# Patient Record
Sex: MALE | Race: WHITE | NOT HISPANIC OR LATINO | Employment: OTHER | URBAN - METROPOLITAN AREA
[De-identification: names, ages, dates, MRNs, and addresses within clinical notes are randomized per-mention and may not be internally consistent; named-entity substitution may affect disease eponyms.]

---

## 2023-10-19 DIAGNOSIS — H40.1131 PRIMARY OPEN ANGLE GLAUCOMA (POAG) OF BOTH EYES, MILD STAGE: Primary | ICD-10-CM

## 2023-10-20 RX ORDER — TIMOLOL MALEATE 2.5 MG/ML
SOLUTION/ DROPS OPHTHALMIC
Qty: 15 ML | Refills: 0 | Status: SHIPPED | OUTPATIENT
Start: 2023-10-20 | End: 2023-12-05 | Stop reason: SDUPTHER

## 2023-12-05 ENCOUNTER — OFFICE VISIT (OUTPATIENT)
Dept: OPHTHALMOLOGY | Facility: CLINIC | Age: 63
End: 2023-12-05
Payer: MEDICARE

## 2023-12-05 DIAGNOSIS — H40.1131 PRIMARY OPEN ANGLE GLAUCOMA (POAG) OF BOTH EYES, MILD STAGE: ICD-10-CM

## 2023-12-05 DIAGNOSIS — H40.1132 PRIMARY OPEN ANGLE GLAUCOMA (POAG) OF BOTH EYES, MODERATE STAGE: Primary | ICD-10-CM

## 2023-12-05 PROCEDURE — 99212 OFFICE O/P EST SF 10 MIN: CPT | Performed by: OPHTHALMOLOGY

## 2023-12-05 RX ORDER — COVID-19 MOLECULAR TEST ASSAY
KIT MISCELLANEOUS
COMMUNITY
Start: 2022-10-28

## 2023-12-05 RX ORDER — TIMOLOL MALEATE 2.5 MG/ML
SOLUTION/ DROPS OPHTHALMIC
Qty: 15 ML | Refills: 6 | Status: SHIPPED | OUTPATIENT
Start: 2023-12-05 | End: 2024-04-23

## 2023-12-05 RX ORDER — COLCHICINE 0.6 MG/1
TABLET ORAL
COMMUNITY
Start: 2023-02-23

## 2023-12-05 ASSESSMENT — CONF VISUAL FIELD
OD_INFERIOR_TEMPORAL_RESTRICTION: 0
OD_SUPERIOR_TEMPORAL_RESTRICTION: 0
OD_SUPERIOR_NASAL_RESTRICTION: 0
OD_INFERIOR_NASAL_RESTRICTION: 0
OS_INFERIOR_TEMPORAL_RESTRICTION: 0
OD_NORMAL: 1
OS_NORMAL: 1
OS_SUPERIOR_TEMPORAL_RESTRICTION: 0
OS_INFERIOR_NASAL_RESTRICTION: 0
OS_SUPERIOR_NASAL_RESTRICTION: 0

## 2023-12-05 ASSESSMENT — TONOMETRY
IOP_METHOD: GOLDMANN APPLANATION
OD_IOP_MMHG: 11
OS_IOP_MMHG: 13

## 2023-12-05 ASSESSMENT — VISUAL ACUITY
OD_CC: 20/20
METHOD: SNELLEN - LINEAR
OS_CC: 20/20
CORRECTION_TYPE: GLASSES

## 2023-12-05 ASSESSMENT — SLIT LAMP EXAM - LIDS
COMMENTS: NORMAL
COMMENTS: NORMAL

## 2023-12-05 ASSESSMENT — EXTERNAL EXAM - RIGHT EYE: OD_EXAM: NORMAL

## 2023-12-05 ASSESSMENT — CUP TO DISC RATIO
OS_RATIO: 0.5
OD_RATIO: 0.55

## 2023-12-05 ASSESSMENT — EXTERNAL EXAM - LEFT EYE: OS_EXAM: NORMAL

## 2024-04-23 DIAGNOSIS — H40.1131 PRIMARY OPEN ANGLE GLAUCOMA (POAG) OF BOTH EYES, MILD STAGE: ICD-10-CM

## 2024-04-23 RX ORDER — TIMOLOL MALEATE 2.5 MG/ML
SOLUTION/ DROPS OPHTHALMIC
Qty: 15 ML | Refills: 0 | Status: SHIPPED | OUTPATIENT
Start: 2024-04-23

## 2024-06-18 ENCOUNTER — APPOINTMENT (OUTPATIENT)
Dept: OPHTHALMOLOGY | Facility: CLINIC | Age: 64
End: 2024-06-18
Payer: MEDICARE

## 2024-06-18 DIAGNOSIS — H40.1131 PRIMARY OPEN ANGLE GLAUCOMA (POAG) OF BOTH EYES, MILD STAGE: ICD-10-CM

## 2024-06-18 DIAGNOSIS — H40.1132 PRIMARY OPEN ANGLE GLAUCOMA (POAG) OF BOTH EYES, MODERATE STAGE: Primary | ICD-10-CM

## 2024-06-18 PROCEDURE — 92083 EXTENDED VISUAL FIELD XM: CPT | Performed by: OPHTHALMOLOGY

## 2024-06-18 PROCEDURE — 99214 OFFICE O/P EST MOD 30 MIN: CPT | Performed by: OPHTHALMOLOGY

## 2024-06-18 RX ORDER — TIMOLOL MALEATE 2.5 MG/ML
SOLUTION/ DROPS OPHTHALMIC
Qty: 15 ML | Refills: 0 | Status: SHIPPED | OUTPATIENT
Start: 2024-06-18

## 2024-06-18 ASSESSMENT — EXTERNAL EXAM - LEFT EYE: OS_EXAM: NORMAL

## 2024-06-18 ASSESSMENT — GONIOSCOPY
OD_SUPERIOR: 3/360
OS_SUPERIOR: 3/360

## 2024-06-18 ASSESSMENT — PACHYMETRY
OD_CT(UM): 545
OS_CT(UM): 542

## 2024-06-18 ASSESSMENT — VISUAL ACUITY
OD_PH_CC: 20/20-1
OS_CC: 20/20
CORRECTION_TYPE: GLASSES
OD_CC: 20/30
METHOD: SNELLEN - LINEAR

## 2024-06-18 ASSESSMENT — TONOMETRY
IOP_METHOD: GOLDMANN APPLANATION
OD_IOP_MMHG: 12
OS_IOP_MMHG: 12

## 2024-06-18 ASSESSMENT — CUP TO DISC RATIO
OS_RATIO: 0.5
OD_RATIO: 0.55

## 2024-06-18 ASSESSMENT — SLIT LAMP EXAM - LIDS
COMMENTS: NORMAL
COMMENTS: NORMAL

## 2024-06-18 ASSESSMENT — EXTERNAL EXAM - RIGHT EYE: OD_EXAM: NORMAL

## 2025-04-22 ENCOUNTER — APPOINTMENT (OUTPATIENT)
Dept: OPHTHALMOLOGY | Facility: CLINIC | Age: 65
End: 2025-04-22
Payer: COMMERCIAL

## 2025-04-22 DIAGNOSIS — H43.391 VITREOUS SYNERESIS OF RIGHT EYE: Primary | ICD-10-CM

## 2025-04-22 PROCEDURE — 99214 OFFICE O/P EST MOD 30 MIN: CPT | Performed by: OPHTHALMOLOGY

## 2025-04-22 ASSESSMENT — PACHYMETRY
OD_CT(UM): 545
OS_CT(UM): 542

## 2025-04-22 ASSESSMENT — VISUAL ACUITY
CORRECTION_TYPE: GLASSES
OD_CC: 20/20
OD_CC+: -1
OS_CC: 20/25
METHOD: SNELLEN - LINEAR

## 2025-04-22 ASSESSMENT — SLIT LAMP EXAM - LIDS
COMMENTS: NORMAL
COMMENTS: NORMAL

## 2025-04-22 ASSESSMENT — ENCOUNTER SYMPTOMS: EYES NEGATIVE: 1

## 2025-04-22 ASSESSMENT — CUP TO DISC RATIO
OD_RATIO: 0.55
OS_RATIO: 0.5

## 2025-04-22 ASSESSMENT — EXTERNAL EXAM - LEFT EYE: OS_EXAM: NORMAL

## 2025-04-22 ASSESSMENT — TONOMETRY
OD_IOP_MMHG: 11
IOP_METHOD: GOLDMANN APPLANATION
OS_IOP_MMHG: 12

## 2025-04-22 ASSESSMENT — EXTERNAL EXAM - RIGHT EYE: OD_EXAM: NORMAL

## 2025-04-22 NOTE — PROGRESS NOTES
1-c. flashing od no jonathan's sign, saw pvd only no holes or tears noted  Refer retian for prn exam if persist.    Retina 1-2 week   Consent Type: Consent 1 (Standard)

## 2025-05-08 ENCOUNTER — APPOINTMENT (OUTPATIENT)
Dept: OPHTHALMOLOGY | Facility: CLINIC | Age: 65
End: 2025-05-08
Payer: COMMERCIAL

## 2025-05-08 DIAGNOSIS — H53.483 GENERALIZED CONTRACTION OF VISUAL FIELD, BILATERAL: ICD-10-CM

## 2025-05-08 DIAGNOSIS — H43.811 PVD (POSTERIOR VITREOUS DETACHMENT), RIGHT EYE: ICD-10-CM

## 2025-05-08 DIAGNOSIS — H43.391 VITREOUS SYNERESIS OF RIGHT EYE: Primary | ICD-10-CM

## 2025-05-08 PROCEDURE — 92201 OPSCPY EXTND RTA DRAW UNI/BI: CPT

## 2025-05-08 PROCEDURE — 99213 OFFICE O/P EST LOW 20 MIN: CPT

## 2025-05-08 PROCEDURE — 92134 CPTRZ OPH DX IMG PST SGM RTA: CPT

## 2025-05-08 ASSESSMENT — EXTERNAL EXAM - LEFT EYE: OS_EXAM: NORMAL

## 2025-05-08 ASSESSMENT — EXTERNAL EXAM - RIGHT EYE: OD_EXAM: NORMAL

## 2025-05-08 ASSESSMENT — TONOMETRY
OD_IOP_MMHG: 10
OS_IOP_MMHG: 10
IOP_METHOD: GOLDMANN APPLANATION

## 2025-05-08 ASSESSMENT — PACHYMETRY
OS_CT(UM): 542
OD_CT(UM): 545

## 2025-05-08 ASSESSMENT — CUP TO DISC RATIO
OD_RATIO: 0.55
OS_RATIO: 0.5

## 2025-05-08 ASSESSMENT — SLIT LAMP EXAM - LIDS
COMMENTS: NORMAL
COMMENTS: NORMAL

## 2025-05-08 ASSESSMENT — ENCOUNTER SYMPTOMS: EYES NEGATIVE: 1

## 2025-05-08 ASSESSMENT — VISUAL ACUITY
METHOD: SNELLEN - LINEAR
OS_CC: 20/20
OD_CC: 20/20

## 2025-05-08 NOTE — PROGRESS NOTES
Posterior vitreous detachment (PVD), right eyeH43.811  - No Hx of trauma  - No Hx or FHx of RRD     Negative Bertha sign  360° scleral depression showed no retinal breaks, holes or retinal detachment    OCT macula 05/08/25   Right eye (OD): PVD, normal foveal contour, RPE, EZ, IZ and inner retinal layers. No IRF or SRF, falling teresa sign  Left eye (OS): Normal foveal contour, RPE, EZ, IZ and inner retinal layers. No IRF or SRF    Impression/Plan  Discussed symptoms of retinal breaks and detachment.   Patient educated that retinal detachment can lead to permanent vision loss. Advised to call immediately if the patient experience any of these symptoms.  Observe  RTC in 6 weeks      Primary open angle glaucoma, both eyes  IOP controlled  Continue care with Dr. Martines

## 2025-06-20 ENCOUNTER — APPOINTMENT (OUTPATIENT)
Dept: OPHTHALMOLOGY | Facility: CLINIC | Age: 65
End: 2025-06-20
Payer: MEDICARE

## 2025-10-28 ENCOUNTER — APPOINTMENT (OUTPATIENT)
Dept: OPHTHALMOLOGY | Facility: CLINIC | Age: 65
End: 2025-10-28
Payer: COMMERCIAL